# Patient Record
Sex: MALE | Race: WHITE
[De-identification: names, ages, dates, MRNs, and addresses within clinical notes are randomized per-mention and may not be internally consistent; named-entity substitution may affect disease eponyms.]

---

## 2017-10-19 ENCOUNTER — HOSPITAL ENCOUNTER (EMERGENCY)
Dept: HOSPITAL 31 - C.ER | Age: 22
Discharge: HOME | End: 2017-10-19
Payer: SELF-PAY

## 2017-10-19 VITALS
SYSTOLIC BLOOD PRESSURE: 149 MMHG | DIASTOLIC BLOOD PRESSURE: 71 MMHG | OXYGEN SATURATION: 97 % | RESPIRATION RATE: 19 BRPM | TEMPERATURE: 98.3 F | HEART RATE: 70 BPM

## 2017-10-19 DIAGNOSIS — S60.221A: Primary | ICD-10-CM

## 2017-10-19 DIAGNOSIS — R56.9: ICD-10-CM
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
## 2017-10-19 NOTE — C.PDOC
History Of Present Illness


22 year old male, whose PMHx includes seizures, presents to the ED for 

evaluation after having an absence seizure earlier tonight. Patient states he 

lost his balance during the episode and fell forward, injuring his right hand. 

Patient now complains of pain and swelling to his right hand. He denies fever, 

chills, tongue injury, urinary/bowel incontinence. 


Chief Complaint (Nursing): Seizure


History Per: Patient


History/Exam Limitations: no limitations


Recent Seizure Activity Began: Just Before Arrival


Number Of Seizures: One


Length Of Seizures (Duration): Unknown


Additional History Per: Patient





Past Medical History


Reviewed: Historical Data, Nursing Documentation, Vital Signs


Vital Signs: 


 Last Vital Signs











Temp  98.8 F   10/19/17 01:04


 


Pulse  82   10/19/17 01:04


 


Resp  20   10/19/17 01:04


 


BP  137/81   10/19/17 01:04


 


Pulse Ox  98   10/19/17 01:51














- Medical History


PMH: Seizures


Surgical History: No Surg Hx


Family History: States: Unknown Family Hx





- Social History


Hx Tobacco Use: No


Hx Alcohol Use: No


Hx Substance Use: Yes





- Immunization History


Hx Influenza Vaccination: Yes


Hx Pneumococcal Vaccination: No





Review Of Systems


Constitutional: Negative for: Fever, Chills


Genitourinary: Negative for: Incontinence


Musculoskeletal: Positive for: Hand Pain (right)


Neurological: Positive for: Seizures





Physical Exam





- Physical Exam


Appears: Non-toxic, No Acute Distress


Skin: Warm, Dry, Ecchymosis (to thenar aspect of right hand )


Head: Atraumatic, Normacephalic


Eye(s): bilateral: Normal Inspection


Oral Mucosa: Moist


Neck: Supple


Chest: Symmetrical, No Deformity, No Tenderness


Cardiovascular: Rhythm Regular, No Murmur


Respiratory: Normal Breath Sounds, No Rales, No Rhonchi, No Wheezing


Extremity: Normal ROM, Tenderness (to thenar aspect of right hand on palpation )

, Capillary Refill (less than 2 seconds ), No Deformity


Pulses: Right Radial: Normal


Neurological/Psych: Oriented x3, Normal Speech, Normal Cognition, Normal 

Sensation


Gait: Steady





ED Course And Treatment


O2 Sat by Pulse Oximetry: 98 (on RA )


Pulse Ox Interpretation: Normal





- Other Rad


  ** Right hand


X-Ray: Interpreted by Me, Viewed By Me


Interpretation: no fracture


Progress Note: Right hand XR ordered and reviewed.





Disposition


Doctor Will See Patient In The: Hospital


Counseled Patient/Family Regarding: Diagnosis





- Disposition


Referrals: 


[facility] at [facility] [Outside]


Disposition: HOME/ ROUTINE


Disposition Time: 01:53


Condition: STABLE


Prescriptions: 


Ibuprofen [Motrin Tab] 600 mg PO Q6 #14 tab


Instructions:  Contusion in Adults (GEN), Hematoma (ED)


Forms:  [facility] (English)





- POA


Present On Arrival: None





- Clinical Impression


Clinical Impression: 


 Contusion, Hematoma








- Scribe Statement


The provider has reviewed the documentation as recorded by the Scribe (Kitty Arreguin)


Provider Attestation: 








All medical record entries made by the Scribe were at my direction and 

personally dictated by me. I have reviewed the chart and agree that the record 

accurately reflects my personal performance of the history, physical exam, 

medical decision making, and the department course for this patient. I have 

also personally directed, reviewed, and agree with the discharge instructions 

and disposition.

## 2017-10-19 NOTE — RAD
PROCEDURE:  Right Hand Radiographs



HISTORY:

injury/ pain



COMPARISON:

None.



FINDINGS:



BONES:

Normal. No fracture. 



JOINTS:

Normal. No osteoarthritic changes. 



SOFT TISSUES:

Normal. 



OTHER FINDINGS:

None.



IMPRESSION:

Normal right hand radiographs.

## 2018-04-14 ENCOUNTER — HOSPITAL ENCOUNTER (EMERGENCY)
Dept: HOSPITAL 31 - C.ER | Age: 23
Discharge: HOME | End: 2018-04-14
Payer: MEDICAID

## 2018-04-14 VITALS — SYSTOLIC BLOOD PRESSURE: 106 MMHG | DIASTOLIC BLOOD PRESSURE: 65 MMHG | HEART RATE: 60 BPM | TEMPERATURE: 97.8 F

## 2018-04-14 VITALS — OXYGEN SATURATION: 99 %

## 2018-04-14 VITALS — RESPIRATION RATE: 18 BRPM
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
## 2018-04-14 DIAGNOSIS — S61.210A: Primary | ICD-10-CM
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
## 2018-04-14 NOTE — C.PDOC
History Of Present Illness





23 y/o male presents with laceration to base of right index finger. pt sts he 

was washing a knife; sponge was in right hand and he swiped the knife with left 

hand onto sponge and cut his finger. denies any numbness, tingling or weakness 

to fingers, pt is right hand dominant. 


Time Seen by Provider: 04/14/18 13:28


Chief Complaint (Nursing): Finger,Hand,&Wrist


History Per: Patient


History/Exam Limitations: no limitations


Onset/Duration Of Symptoms: Hrs (1)


Current Symptoms Are (Timing): Still Present


Quality: Sharp





Past Medical History


Reviewed: Historical Data, Nursing Documentation, Vital Signs


Vital Signs: 


 Last Vital Signs











Temp  97.8 F   04/14/18 14:56


 


Pulse  60   04/14/18 14:56


 


Resp  18   04/14/18 14:56


 


BP  106/65   04/14/18 14:56


 


Pulse Ox  99   04/14/18 20:33














- Medical History


PMH: Seizures


Family History: States: Unknown Family Hx





- Social History


Hx Tobacco Use: No


Hx Alcohol Use: No


Hx Substance Use: Yes (marijuana)





- Immunization History


Hx Tetanus Toxoid Vaccination: Yes (dec 2017)


Hx Influenza Vaccination: Yes


Hx Pneumococcal Vaccination: No





Review Of Systems


Skin: Positive for: Other (laceration right index finger)


Neurological: Negative for: Weakness, Numbness





Physical Exam





- Physical Exam


Appears: Non-toxic, No Acute Distress


Skin: Warm, Dry, Other (1.5 cm shallow laceration at base of right index finger

, palmar surface, no active bleeding. )


Extremity: Normal ROM (able to flex and extend right index finger with no 

difficulty. )


Pulses: Right Radial: Normal





ED Course And Treatment


O2 Sat by Pulse Oximetry: 99





Laceration





- Laceration Repair


  ** right index finger


Wound Length (In cm): 1.5


Description Of Wound: Linear, Clean


Wound Cleansed With: Betadine, Sterile Saline


Anesthesia: Lidocaine 1%


Wound Examination: Irrigated With Saline, No FB With Wound Exploration, No 

Tendon Injury With Wound Exploration


Wound Closure: Suture (5-0 ethilon)


Suture Technique And Material Used: Interrupted (#5 sutures)


Wound Complexity: Simple





Medical Decision Making


Medical Decision Making: 





pt with laceration to right index finger, tdap utd, wound repaired with no 

complications. 





Disposition


Counseled Patient/Family Regarding: Diagnosis, Need For Followup





- Disposition


Disposition: HOME/ ROUTINE


Disposition Time: 14:43


Condition: GOOD


Additional Instructions: 


Keep wound clean and dry; apply bacitracin to wound twice a day.  Suture 

removal in 7-10 days. Tylenol or Motrin for pain.  Return to ER for any sign of 

infection. Please follow up with your primary care doctor and neurologist for 

routine care. 


Instructions:  Laceration Repair With Stitches (DC)


Forms:  [facility] (English), General Discharge Instructions





- Clinical Impression


Clinical Impression: 


 Laceration of right index finger

## 2018-04-22 ENCOUNTER — HOSPITAL ENCOUNTER (EMERGENCY)
Dept: HOSPITAL 31 - C.ER | Age: 23
Discharge: HOME | End: 2018-04-22
Payer: MEDICAID

## 2018-04-22 VITALS
TEMPERATURE: 98.2 F | HEART RATE: 85 BPM | SYSTOLIC BLOOD PRESSURE: 118 MMHG | RESPIRATION RATE: 16 BRPM | OXYGEN SATURATION: 96 % | DIASTOLIC BLOOD PRESSURE: 67 MMHG

## 2018-04-22 VITALS — BODY MASS INDEX: 25.9 KG/M2

## 2018-04-22 DIAGNOSIS — Z48.02: Primary | ICD-10-CM

## 2018-04-22 NOTE — C.PDOC
History Of Present Illness


22 year old male presents to the ED for a suture removal. Patient underwent 

laceration repair in [facility] on 4/14 after he accidentally cut himself with a 

knife. Patient states wound is well healing and denies fever, chills, pain, 

swelling or drainage from the area. 


Time Seen by Provider: 04/22/18 18:00


Chief Complaint (Nursing): Suture/Staple Removal


History Per: Patient


History/Exam Limitations: no limitations


Onset/Duration Of Symptoms: Days Ago


Current Symptoms Are (Timing): Better


Quality Of Symptoms: denies: Painful, Swollen, Draining


Additional History Per: Patient





Past Medical History


Reviewed: Historical Data, Nursing Documentation, Vital Signs


Vital Signs: 


 Last Vital Signs











Temp  98.2 F   04/22/18 18:03


 


Pulse  85   04/22/18 18:03


 


Resp  16   04/22/18 18:03


 


BP  118/67   04/22/18 18:03


 


Pulse Ox  96   04/22/18 22:29














- Medical History


PMH: Seizures


Surgical History: No Surg Hx


Family History: States: Unknown Family Hx





- Social History


Hx Tobacco Use: No


Hx Alcohol Use: No


Hx Substance Use: Yes





- Immunization History


Hx Tetanus Toxoid Vaccination: Yes (dec 2017)


Hx Influenza Vaccination: Yes


Hx Pneumococcal Vaccination: No





Review Of Systems


Skin: Positive for: Other (suture removal )





Physical Exam





- Physical Exam


Appears: Non-toxic, No Acute Distress


Skin: Normal Color, Warm, Dry, Other (well-healing wound at base of right index 

finger, palmar surface. no erythema )


Extremity: Normal ROM, No Tenderness, Capillary Refill (less than 2 seconds ), 

No Deformity, No Swelling


Neurological/Psych: Normal Speech, Normal Cognition





ED Course And Treatment


O2 Sat by Pulse Oximetry: 96 (on RA)


Pulse Ox Interpretation: Normal





Medical Decision Making


Medical Decision Making: 





Sutures removed successfully. Dressing applied to area. Patient tolerated well 

with no complaints. 





Disposition


Counseled Patient/Family Regarding: Diagnosis





- Disposition


Disposition: HOME/ ROUTINE


Disposition Time: 18:11


Condition: STABLE


Instructions:  Stitches Removal


Forms:  [facility] (English), General Discharge Instructions





- POA


Present On Arrival: None





- Clinical Impression


Clinical Impression: 


 Removal of suture








- Scribe Statement


The provider has reviewed the documentation as recorded by the Scribe (Kitty Arreguin)


Provider Attestation: 








All medical record entries made by the Scribe were at my direction and 

personally dictated by me. I have reviewed the chart and agree that the record 

accurately reflects my personal performance of the history, physical exam, 

medical decision making, and the department course for this patient. I have 

also personally directed, reviewed, and agree with the discharge instructions 

and disposition.

## 2018-04-27 ENCOUNTER — HOSPITAL ENCOUNTER (EMERGENCY)
Dept: HOSPITAL 31 - C.ER | Age: 23
LOS: 1 days | Discharge: HOME | End: 2018-04-28
Payer: MEDICAID

## 2018-04-27 VITALS — BODY MASS INDEX: 25.9 KG/M2

## 2018-04-27 DIAGNOSIS — J02.9: Primary | ICD-10-CM

## 2018-04-28 VITALS
HEART RATE: 100 BPM | DIASTOLIC BLOOD PRESSURE: 80 MMHG | TEMPERATURE: 99 F | SYSTOLIC BLOOD PRESSURE: 140 MMHG | RESPIRATION RATE: 18 BRPM | OXYGEN SATURATION: 99 %

## 2018-04-28 NOTE — C.PDOC
History Of Present Illness





23 yo male come in for evaluation of bodyaches, subjective fever for past 3 

days associated with sore throat. Otherwise, pt denies high fever, lethargy, 

severe headache, dizziness vertigo, drooling, dyspnea, cough, CP, SOB, wheezing

, abd. pain, V/D, rash, denies any other active complaints. Ambulate to ED for 

evaluation, not in any apparent distress. Last dose of tylenol 650 mg 1 hr PTA.





HPI: Influenza


Time Seen by Provider: 04/27/18 23:32


Chief Complaint: Fever


History Per: Patient


Exam Limitations: no limitations


Have you had recent travel within the past 21 days to any of the following 

countries: [place], [place], [place] or [place]?: No


Onset/Duration Of Symptoms: Days (3)


Symptoms include: fever, bodyaches.  denies: seizure


Sick Contacts (Context): None


Hx Influenza Vaccination: No





Past Medical History


Reviewed: Historical Data, Nursing Documentation, Vital Signs


Vital Signs: 


 Last Vital Signs











Temp  99.8 F H  04/27/18 23:27


 


Pulse  115 H  04/27/18 23:27


 


Resp  16   04/27/18 23:27


 


BP  145/81   04/27/18 23:27


 


Pulse Ox  98   04/27/18 23:27














- Medical History


PMH: Seizures


Surgical History: No Surg Hx


Family History: States: Unknown Family Hx





- Social History


Hx Tobacco Use: No


Hx Alcohol Use: No


Hx Substance Use: Yes





- Immunization History


Hx Tetanus Toxoid Vaccination: Yes (dec 2017)


Hx Influenza Vaccination: Yes


Hx Pneumococcal Vaccination: No





Review Of Systems


Except As Marked, All Systems Reviewed And Found Negative.


Constitutional: Positive for: Fever (subjective), Malaise.  Negative for: Chills


Eyes: Negative for: Vision Change


ENT: Positive for: Nose Congestion, Throat Pain, Throat Swelling.  Negative for

: Ear Discharge, Nose Discharge


Cardiovascular: Negative for: Chest Pain


Respiratory: Negative for: Cough, Shortness of Breath, Wheezing


Gastrointestinal: Negative for: Nausea, Vomiting, Abdominal Pain, Diarrhea


Genitourinary: Negative for: Dysuria


Musculoskeletal: Negative for: Neck Pain, Back Pain


Skin: Negative for: Rash


Neurological: Negative for: Weakness, Numbness, Altered Mental Status, Headache

, Dizziness





Physical Exam





- Physical Exam


Appears: Well, Non-toxic, No Acute Distress


Skin: Normal Color, Warm, Dry, No Rash


Head: Normacephalic


Eye(s): bilateral: PERRL


Ear(s): Bilateral: Normal


Nose: No Flaring, No Discharge


Oral Mucosa: Moist, No Drooling, No Trismus


Tongue: Normal Appearing


Lips: Normal Appearing


Throat: Erythema (mod B/L), Exudate (Right), No Drooling, Other (uvula midline, 

no edema.)


Neck: Trachea Midline, Supple


Cardiovascular: Rhythm Regular


Respiratory: No Decreased Breath Sounds, No Accessory Muscle Use, No Stridor, 

No Wheezing


Gastrointestinal/Abdominal: Soft, No Tenderness, No Distention, No Guarding


Extremity: Normal ROM, No Deformity, No Swelling


Neurological/Psych: Oriented x3, Normal Speech





- ECG


O2 Sat by Pulse Oximetry: 98


Pulse Ox Interpretation: Normal





- Progress


ED Course And Treament: 


On re-evaluation, pt is afebrile, hemodynamicaly stable.


non-toxic. Tolerate Po well in ED.


PulsEOx 98% RA


ENT: exam c/w Right pharyngitis r/o acute tonsillitis. Uvula midline, no edema, 

no drooling, no trismus.


neck: Supple, (-) meningeal sign.


Lungs: CTA B/L, BS equal B/L.


Abd: benign.


Neurologicaly intact.


Pt advised on course of ds.


ref. to f/u with PMD, ENT In 2 days for re-eavl.


return to ED if any worsening or new changes.








Disposition


Counseled Patient/Family Regarding: Studies Performed, Diagnosis, Need For 

Followup, Rx Given





- Disposition


Referrals: 


 at [facility] [Outside]


Disposition: HOME/ ROUTINE


Disposition Time: 00:53


Condition: STABLE


Additional Instructions: 


Encourage fluids


Take medication as prescribed


Follow up with PMD in 2 days for re-evaluation.


return to ED if any worsening or new changes.


Prescriptions: 


Amoxicillin/Clavulanate [Augmentin 875 MG-125 MG] 1 tab PO BID #14 tab


Instructions:  Sore Throat in Adults


Forms:  [facility] (English)





- Clinical Impression


Clinical Impression: 


 Pharyngitis